# Patient Record
Sex: FEMALE | Race: OTHER | NOT HISPANIC OR LATINO | Employment: UNEMPLOYED | URBAN - METROPOLITAN AREA
[De-identification: names, ages, dates, MRNs, and addresses within clinical notes are randomized per-mention and may not be internally consistent; named-entity substitution may affect disease eponyms.]

---

## 2024-05-29 RX ORDER — OXYCODONE HYDROCHLORIDE AND ACETAMINOPHEN 5; 325 MG/1; MG/1
1 TABLET ORAL EVERY 4 HOURS PRN
COMMUNITY
Start: 2024-05-25 | End: 2024-06-01

## 2024-05-29 RX ORDER — METHOCARBAMOL 500 MG/1
500 TABLET, FILM COATED ORAL 4 TIMES DAILY
COMMUNITY
Start: 2024-05-25 | End: 2024-06-04

## 2024-05-29 RX ORDER — PAROXETINE HYDROCHLORIDE 40 MG/1
40 TABLET, FILM COATED ORAL DAILY
COMMUNITY
Start: 2024-03-01

## 2024-05-29 RX ORDER — IBUPROFEN 600 MG/1
600 TABLET ORAL EVERY 6 HOURS PRN
COMMUNITY
Start: 2024-05-25 | End: 2024-06-04

## 2024-05-29 RX ORDER — MIRTAZAPINE 7.5 MG/1
7.5 TABLET, FILM COATED ORAL
COMMUNITY
Start: 2024-03-01

## 2024-05-29 RX ORDER — ESTRADIOL 1 MG/1
1 TABLET ORAL DAILY
COMMUNITY
Start: 2024-05-08 | End: 2025-05-08

## 2024-05-29 RX ORDER — LIDOCAINE 50 MG/G
1 PATCH TOPICAL EVERY 24 HOURS
COMMUNITY
Start: 2024-05-25 | End: 2024-06-04

## 2024-05-29 RX ORDER — CYCLOBENZAPRINE HCL 10 MG
10 TABLET ORAL
COMMUNITY
Start: 2023-12-16

## 2024-05-29 RX ORDER — CLONAZEPAM 1 MG/1
1 TABLET ORAL EVERY MORNING
COMMUNITY

## 2024-05-30 ENCOUNTER — OFFICE VISIT (OUTPATIENT)
Age: 34
End: 2024-05-30

## 2024-05-30 VITALS
DIASTOLIC BLOOD PRESSURE: 85 MMHG | WEIGHT: 155.6 LBS | HEIGHT: 66 IN | HEART RATE: 100 BPM | SYSTOLIC BLOOD PRESSURE: 119 MMHG | BODY MASS INDEX: 25.01 KG/M2 | OXYGEN SATURATION: 100 %

## 2024-05-30 DIAGNOSIS — S16.1XXA STRAIN OF NECK MUSCLE, INITIAL ENCOUNTER: ICD-10-CM

## 2024-05-30 DIAGNOSIS — S39.012A STRAIN OF LUMBAR REGION, INITIAL ENCOUNTER: ICD-10-CM
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
## 2024-05-30 PROBLEM — K58.9 IRRITABLE BOWEL SYNDROME: Status: ACTIVE | Noted: 2018-10-02

## 2024-05-30 PROBLEM — E55.9 VITAMIN D DEFICIENCY: Status: ACTIVE | Noted: 2022-02-03

## 2024-05-30 PROBLEM — F41.1 ANXIETY STATE: Status: ACTIVE | Noted: 2021-07-07

## 2024-05-30 PROBLEM — N92.0 MENORRHAGIA: Status: ACTIVE | Noted: 2022-04-18

## 2024-05-30 PROBLEM — K21.9 GERD (GASTROESOPHAGEAL REFLUX DISEASE): Status: ACTIVE | Noted: 2022-04-20

## 2024-05-30 PROBLEM — D64.9 ANEMIA: Status: ACTIVE | Noted: 2022-04-18

## 2024-05-30 PROBLEM — G25.81 RESTLESS LEG SYNDROME: Status: ACTIVE | Noted: 2022-04-16

## 2024-05-30 PROBLEM — G43.009 MIGRAINE WITHOUT AURA: Status: ACTIVE | Noted: 2021-07-01

## 2024-05-30 PROCEDURE — 99214 OFFICE O/P EST MOD 30 MIN: CPT | Performed by: FAMILY MEDICINE

## 2024-05-30 RX ORDER — ZOLPIDEM TARTRATE 10 MG/1
10 TABLET ORAL
COMMUNITY
Start: 2024-05-24

## 2024-05-30 RX ORDER — METHYLPREDNISOLONE 4 MG/1
TABLET ORAL
Qty: 21 EACH | Refills: 0 | Status: SHIPPED | OUTPATIENT
Start: 2024-05-30

## 2024-05-30 RX ORDER — BUSPIRONE HYDROCHLORIDE 10 MG/1
10 TABLET ORAL 3 TIMES DAILY
COMMUNITY
Start: 2024-05-20
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
## 2024-08-19 ENCOUNTER — TELEPHONE (OUTPATIENT)
Age: 34
End: 2024-08-19

## 2024-08-21 RX ORDER — MIRTAZAPINE 15 MG/1
15 TABLET, FILM COATED ORAL
COMMUNITY
Start: 2024-07-16

## 2024-08-23 ENCOUNTER — OFFICE VISIT (OUTPATIENT)
Age: 34
End: 2024-08-23
Payer: COMMERCIAL

## 2024-08-23 VITALS
OXYGEN SATURATION: 99 % | HEIGHT: 66 IN | BODY MASS INDEX: 24.62 KG/M2 | HEART RATE: 76 BPM | WEIGHT: 153.2 LBS | DIASTOLIC BLOOD PRESSURE: 60 MMHG | RESPIRATION RATE: 18 BRPM | SYSTOLIC BLOOD PRESSURE: 100 MMHG

## 2024-08-23 DIAGNOSIS — G43.009 MIGRAINE WITHOUT AURA AND WITHOUT STATUS MIGRAINOSUS, NOT INTRACTABLE: ICD-10-CM

## 2024-08-23 DIAGNOSIS — E86.0 DEHYDRATION: ICD-10-CM

## 2024-08-23 DIAGNOSIS — R00.2 PALPITATIONS: Primary | ICD-10-CM

## 2024-08-23 DIAGNOSIS — D50.9 IRON DEFICIENCY ANEMIA, UNSPECIFIED IRON DEFICIENCY ANEMIA TYPE: ICD-10-CM

## 2024-08-23 DIAGNOSIS — E87.6 HYPOKALEMIA: ICD-10-CM

## 2024-08-23 DIAGNOSIS — F41.9 ANXIETY: ICD-10-CM

## 2024-08-23 PROCEDURE — 99214 OFFICE O/P EST MOD 30 MIN: CPT | Performed by: FAMILY MEDICINE

## 2024-08-23 NOTE — ASSESSMENT & PLAN NOTE
Discussion, positive thinking, stop negative thinking, exercise, meditate, limit social media.  Continue same meds.

## 2024-08-23 NOTE — PROGRESS NOTES
Ambulatory Visit  Name: Darling Underwood      : 1990      MRN: [id number]  Encounter Provider: Lyle Hicks MD  Encounter Date: 2024   Encounter department: [facility]    Assessment & Plan   1. Palpitations  -     Comprehensive metabolic panel; Future; Expected date: 2024  -     TSH + Free T4; Future  2. Dehydration  -     CBC and differential; Future  -     Comprehensive metabolic panel; Future; Expected date: 2024  3. Hypokalemia  -     Comprehensive metabolic panel; Future; Expected date: 2024  4. Anxiety  Comments:  See Psychiatry as scheduled.  Assessment & Plan:  Discussion, positive thinking, stop negative thinking, exercise, meditate, limit social media.  Continue same meds.   5. Iron deficiency anemia, unspecified iron deficiency anemia type  6. Migraine without aura and without status migrainosus, not intractable  -     Erenumab-aooe 140 MG/ML SOAJ; Inject 140 mg under the skin every 30 (thirty) days  -     rimegepant sulfate (NURTEC) 75 mg TBDP; Take 1 tablet (75 mg total) by mouth daily as needed (headache)     History of Present Illness     Pt is here for a Post Hospital Visit from Friday from [facility]. Pt states she was there for palpitations ( tachycardia). Pt states she was treated for dehydration and low electrolytes. Pt was disoriented and had low Potassium levels. Pt states this was going on for about a year. Pt states no recent episodes of passing out like before.They felt may have had a panic attack in addition. Given IV fluids improved. Still with fatigue.         Review of Systems   Constitutional:  Negative for fatigue.   Respiratory:  Negative for shortness of breath.    Cardiovascular:  Negative for chest pain.   Gastrointestinal:  Negative for abdominal pain, constipation and diarrhea.   Genitourinary:  Negative for dysuria.   Neurological:  Negative for dizziness.       Objective     /60 (BP Location: Right arm,  "Patient Position: Sitting, Cuff Size: Large)   Pulse 76   Resp 18   Ht 5' 6\" (1.676 m)   Wt 69.5 kg (153 lb 3.2 oz)   LMP  (LMP Unknown)   SpO2 99%   BMI 24.73 kg/m²     Physical Exam  Vitals and nursing note reviewed.   Constitutional:       Appearance: She is well-developed.   HENT:      Head: Normocephalic and atraumatic.   Eyes:      Conjunctiva/sclera: Conjunctivae normal.   Cardiovascular:      Rate and Rhythm: Normal rate and regular rhythm.      Heart sounds: No murmur heard.  Pulmonary:      Breath sounds: Normal breath sounds.   Abdominal:      Palpations: Abdomen is soft.   Musculoskeletal:      Cervical back: Neck supple.   Skin:     General: Skin is warm and dry.   Neurological:      Mental Status: She is alert.   Psychiatric:         Mood and Affect: Mood normal.       Administrative Statements           "

## 2024-08-27 ENCOUNTER — APPOINTMENT (OUTPATIENT)
Age: 34
End: 2024-08-27
Payer: COMMERCIAL

## 2024-08-27 DIAGNOSIS — R00.2 PALPITATIONS: ICD-10-CM

## 2024-08-27 DIAGNOSIS — E86.0 DEHYDRATION: ICD-10-CM

## 2024-08-27 DIAGNOSIS — E87.6 HYPOKALEMIA: ICD-10-CM

## 2024-08-27 LAB
BASOPHILS # BLD AUTO: 0.05 THOUSANDS/ÂΜL (ref 0–0.1)
BASOPHILS NFR BLD AUTO: 1 % (ref 0–1)
EOSINOPHIL # BLD AUTO: 0.05 THOUSAND/ÂΜL (ref 0–0.61)
EOSINOPHIL NFR BLD AUTO: 1 % (ref 0–6)
ERYTHROCYTE [DISTWIDTH] IN BLOOD BY AUTOMATED COUNT: 11.9 % (ref 11.6–15.1)
HCT VFR BLD AUTO: 41.2 % (ref 34.8–46.1)
HGB BLD-MCNC: 14.1 G/DL (ref 11.5–15.4)
IMM GRANULOCYTES # BLD AUTO: 0.02 THOUSAND/UL (ref 0–0.2)
IMM GRANULOCYTES NFR BLD AUTO: 0 % (ref 0–2)
LYMPHOCYTES # BLD AUTO: 2.76 THOUSANDS/ÂΜL (ref 0.6–4.47)
LYMPHOCYTES NFR BLD AUTO: 39 % (ref 14–44)
MCH RBC QN AUTO: 29.6 PG (ref 26.8–34.3)
MCHC RBC AUTO-ENTMCNC: 34.2 G/DL (ref 31.4–37.4)
MCV RBC AUTO: 86 FL (ref 82–98)
MONOCYTES # BLD AUTO: 0.55 THOUSAND/ÂΜL (ref 0.17–1.22)
MONOCYTES NFR BLD AUTO: 8 % (ref 4–12)
NEUTROPHILS # BLD AUTO: 3.61 THOUSANDS/ÂΜL (ref 1.85–7.62)
NEUTS SEG NFR BLD AUTO: 51 % (ref 43–75)
NRBC BLD AUTO-RTO: 0 /100 WBCS
PLATELET # BLD AUTO: 292 THOUSANDS/UL (ref 149–390)
PMV BLD AUTO: 10.5 FL (ref 8.9–12.7)
RBC # BLD AUTO: 4.77 MILLION/UL (ref 3.81–5.12)
TSH SERPL DL<=0.05 MIU/L-ACNC: 0.82 UIU/ML (ref 0.45–4.5)
WBC # BLD AUTO: 7.04 THOUSAND/UL (ref 4.31–10.16)

## 2024-08-27 PROCEDURE — 85025 COMPLETE CBC W/AUTO DIFF WBC: CPT

## 2024-08-27 PROCEDURE — 84443 ASSAY THYROID STIM HORMONE: CPT

## 2024-08-27 PROCEDURE — 36415 COLL VENOUS BLD VENIPUNCTURE: CPT

## 2024-08-27 PROCEDURE — 80053 COMPREHEN METABOLIC PANEL: CPT

## 2024-08-28 LAB
ALBUMIN SERPL BCG-MCNC: 4.6 G/DL (ref 3.5–5)
ALP SERPL-CCNC: 41 U/L (ref 34–104)
ALT SERPL W P-5'-P-CCNC: 14 U/L (ref 7–52)
ANION GAP SERPL CALCULATED.3IONS-SCNC: 9 MMOL/L (ref 4–13)
AST SERPL W P-5'-P-CCNC: 15 U/L (ref 13–39)
BILIRUB SERPL-MCNC: 0.55 MG/DL (ref 0.2–1)
BUN SERPL-MCNC: 10 MG/DL (ref 5–25)
CALCIUM SERPL-MCNC: 9.5 MG/DL (ref 8.4–10.2)
CHLORIDE SERPL-SCNC: 104 MMOL/L (ref 96–108)
CO2 SERPL-SCNC: 28 MMOL/L (ref 21–32)
CREAT SERPL-MCNC: 1.01 MG/DL (ref 0.6–1.3)
GFR SERPL CREATININE-BSD FRML MDRD: 73 ML/MIN/1.73SQ M
GLUCOSE P FAST SERPL-MCNC: 79 MG/DL (ref 65–99)
POTASSIUM SERPL-SCNC: 4.2 MMOL/L (ref 3.5–5.3)
PROT SERPL-MCNC: 6.9 G/DL (ref 6.4–8.4)
SODIUM SERPL-SCNC: 141 MMOL/L (ref 135–147)

## 2025-06-10 ENCOUNTER — EVALUATION (OUTPATIENT)
Age: 35
End: 2025-06-10
Payer: COMMERCIAL

## 2025-06-10 DIAGNOSIS — M79.604 RIGHT LEG PAIN: ICD-10-CM

## 2025-06-10 DIAGNOSIS — G89.29 CHRONIC RIGHT-SIDED LOW BACK PAIN, UNSPECIFIED WHETHER SCIATICA PRESENT: Primary | ICD-10-CM

## 2025-06-10 DIAGNOSIS — M25.69 BACK STIFFNESS: ICD-10-CM

## 2025-06-10 DIAGNOSIS — M54.50 CHRONIC RIGHT-SIDED LOW BACK PAIN, UNSPECIFIED WHETHER SCIATICA PRESENT: Primary | ICD-10-CM

## 2025-06-10 PROCEDURE — 97110 THERAPEUTIC EXERCISES: CPT

## 2025-06-10 PROCEDURE — 97161 PT EVAL LOW COMPLEX 20 MIN: CPT

## 2025-06-10 NOTE — HOME EXERCISE EDUCATION
Program_ID:[id number]   Access Code: [email or web address]  URL: [email or web address]  Date: 06-  Prepared By: Shivani Langston    Program Notes      Exercises      - Prone Press Up - 10 x daily - 7 x weekly - 1-2 sets - 10 reps      - Standing Lumbar Extension - 10 x daily - 7 x weekly - 1-2 sets - 10 reps      - Standing Lumbar Extension at Wall - Forearms - 10 x daily - 7 x weekly - 1-2 sets - 10 reps      - Supine Transversus Abdominis Bracing - Hands on Stomach - 2 x daily - 7 x weekly -  sets - 20 reps - 5 hold
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
## 2025-06-17 ENCOUNTER — OFFICE VISIT (OUTPATIENT)
Age: 35
End: 2025-06-17
Payer: COMMERCIAL

## 2025-06-17 DIAGNOSIS — M54.50 CHRONIC RIGHT-SIDED LOW BACK PAIN, UNSPECIFIED WHETHER SCIATICA PRESENT: Primary | ICD-10-CM

## 2025-06-17 DIAGNOSIS — G89.29 CHRONIC RIGHT-SIDED LOW BACK PAIN, UNSPECIFIED WHETHER SCIATICA PRESENT: Primary | ICD-10-CM

## 2025-06-17 DIAGNOSIS — M25.69 BACK STIFFNESS: ICD-10-CM

## 2025-06-17 DIAGNOSIS — M79.604 RIGHT LEG PAIN: ICD-10-CM

## 2025-06-17 PROCEDURE — 97112 NEUROMUSCULAR REEDUCATION: CPT

## 2025-06-17 PROCEDURE — 97110 THERAPEUTIC EXERCISES: CPT

## 2025-06-17 NOTE — HOME EXERCISE EDUCATION
Program_ID:[id number]   Access Code: [email or web address]  URL: [email or web address]  Date: 06-  Prepared By: Shivani Langston    Program Notes      Exercises      - Prone Press Up - 10 x daily - 7 x weekly - 1-2 sets - 10 reps      - Standing Lumbar Extension - 10 x daily - 7 x weekly - 1-2 sets - 10 reps      - Standing Lumbar Extension at Wall - Forearms - 10 x daily - 7 x weekly - 1-2 sets - 10 reps      - Side Plank on Knees - 1 x daily - 5-7 x weekly -  sets - 3 reps - 30 hold      - Side Plank on Elbow - 1 x daily - 5-7 x weekly -  sets - 3 reps - 30 hold      - Supine Dead Bug with Leg Extension - 1 x daily - 5-7 x weekly - 2-3 sets - 10 reps      - Squat with Chair Touch - 1 x daily - 5-7 x weekly - 2-3 sets - 10 reps      - Forward T - 1 x daily - 3-5 x weekly - 2-3 sets - 10 reps
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
## 2025-06-27 ENCOUNTER — APPOINTMENT (OUTPATIENT)
Age: 35
End: 2025-06-27
Payer: COMMERCIAL

## 2025-07-14 ENCOUNTER — TELEPHONE (OUTPATIENT)
Age: 35
End: 2025-07-14

## 2025-07-14 NOTE — TELEPHONE ENCOUNTER
Unable to leave message for pt in regards to No Show appt 7/14/25. Sent SMS notification to pt's phone to reschedule appt.